# Patient Record
Sex: FEMALE | Race: WHITE | NOT HISPANIC OR LATINO | Employment: OTHER | URBAN - METROPOLITAN AREA
[De-identification: names, ages, dates, MRNs, and addresses within clinical notes are randomized per-mention and may not be internally consistent; named-entity substitution may affect disease eponyms.]

---

## 2023-10-13 DIAGNOSIS — G56.22 ULNAR NEUROPATHY AT WRIST, LEFT: ICD-10-CM

## 2023-10-18 DIAGNOSIS — Z96.659 TOTAL KNEE REPLACEMENT STATUS, UNSPECIFIED LATERALITY: Primary | ICD-10-CM

## 2023-10-18 RX ORDER — AMOXICILLIN 500 MG/1
CAPSULE ORAL
Qty: 4 CAPSULE | Refills: 2 | Status: SHIPPED | OUTPATIENT
Start: 2023-10-18 | End: 2024-03-06 | Stop reason: HOSPADM

## 2023-11-28 ENCOUNTER — HOSPITAL ENCOUNTER (OUTPATIENT)
Dept: NEUROLOGY | Facility: HOSPITAL | Age: 78
Discharge: HOME | End: 2023-11-28
Payer: MEDICARE

## 2023-11-28 DIAGNOSIS — G56.22 ULNAR NEUROPATHY AT WRIST, LEFT: ICD-10-CM

## 2023-11-28 PROCEDURE — 95910 NRV CNDJ TEST 7-8 STUDIES: CPT

## 2023-12-04 ENCOUNTER — TRANSCRIBE ORDERS (OUTPATIENT)
Dept: OPERATING ROOM | Facility: HOSPITAL | Age: 78
End: 2023-12-04

## 2023-12-04 ENCOUNTER — OFFICE VISIT (OUTPATIENT)
Dept: ORTHOPEDIC SURGERY | Facility: CLINIC | Age: 78
End: 2023-12-04
Payer: MEDICARE

## 2023-12-04 DIAGNOSIS — M25.512 ACUTE PAIN OF LEFT SHOULDER: ICD-10-CM

## 2023-12-04 DIAGNOSIS — G56.02 CARPAL TUNNEL SYNDROME, LEFT UPPER LIMB: Primary | ICD-10-CM

## 2023-12-04 DIAGNOSIS — G56.02 CARPAL TUNNEL SYNDROME OF LEFT WRIST: Primary | ICD-10-CM

## 2023-12-04 PROCEDURE — 99214 OFFICE O/P EST MOD 30 MIN: CPT | Performed by: ORTHOPAEDIC SURGERY

## 2023-12-04 PROCEDURE — L3908 WHO COCK-UP NONMOLDE PRE OTS: HCPCS | Performed by: ORTHOPAEDIC SURGERY

## 2023-12-04 NOTE — PROGRESS NOTES
History: Rita is here for her left hand.  She still having some persistent numbness in the first and second fingers mainly.  She states it does not bother her before the left reverse shoulder replacement.  She continues to work on exercises for the shoulder and has done quite well from that standpoint.  She did obtain an EMG if she is nearly a year out from her shoulder surgery.     Past medical history: Multiple  Medications: Multiple  Allergies: No known drug allergies    Please refer to the intake H&P regarding the patient's review of systems, family history and social history as was done today    HEENT: Normal  Lungs: Clear to auscultation  Heart: RRR  Abdomen: Soft, nontender  Skin: clear  Extremity: She has continued numbness and tingling in the first 2 and half fingers.  There is slight thenar atrophy.  She can fully flex and extend each digit.  No ulnar-sided numbness or tingling.  Mildly positive Tinel's at the wrist.  She get the arm overhead as well as internal rotation to L5.  Decent shoulder strength with gentle stressing.  Contralateral exam is normal for strength, motion, stability and neurovascular assessment.    Radiographs: EMGs show mild to moderate carpal tunnel syndrome on the left side.    Assessment: Left carpal tunnel syndrome, stable left reverse shoulder arthroplasty.    Plan: She has had persistent numbness ever since her surgery.  At this point she is dropping out of it she feels it is getting worse.  She would like proceed with carpal tunnel release.  I will get her a brace to wear at night after after the surgery.  There was no more proximal nerve involvement however she understands she may not that she complete relief of pain surgery this is truly a sequelae of nerve irritation from the shoulder.  This note was generated with voice recognition software and may contain grammatical errors.

## 2024-01-08 ENCOUNTER — APPOINTMENT (OUTPATIENT)
Dept: ORTHOPEDIC SURGERY | Facility: CLINIC | Age: 79
End: 2024-01-08
Payer: MEDICARE

## 2024-01-17 ENCOUNTER — APPOINTMENT (OUTPATIENT)
Dept: ORTHOPEDIC SURGERY | Facility: CLINIC | Age: 79
End: 2024-01-17
Payer: MEDICARE

## 2024-01-19 ENCOUNTER — OFFICE VISIT (OUTPATIENT)
Dept: ORTHOPEDIC SURGERY | Facility: CLINIC | Age: 79
End: 2024-01-19
Payer: MEDICARE

## 2024-01-19 DIAGNOSIS — G56.02 CARPAL TUNNEL SYNDROME OF LEFT WRIST: Primary | ICD-10-CM

## 2024-01-19 PROCEDURE — 99214 OFFICE O/P EST MOD 30 MIN: CPT | Performed by: ORTHOPAEDIC SURGERY

## 2024-01-19 PROCEDURE — 1159F MED LIST DOCD IN RCRD: CPT | Performed by: ORTHOPAEDIC SURGERY

## 2024-01-19 RX ORDER — GABAPENTIN 100 MG/1
100 CAPSULE ORAL
COMMUNITY
Start: 2023-11-10 | End: 2024-11-09

## 2024-01-19 RX ORDER — BUSPIRONE HYDROCHLORIDE 10 MG/1
1 TABLET ORAL
COMMUNITY
Start: 2023-02-07

## 2024-01-19 RX ORDER — CELECOXIB 200 MG/1
200 CAPSULE ORAL AS NEEDED
COMMUNITY
Start: 2023-11-16

## 2024-01-19 RX ORDER — FAMOTIDINE 20 MG/1
20 TABLET, FILM COATED ORAL 2 TIMES DAILY
COMMUNITY
Start: 2023-11-10

## 2024-01-19 NOTE — PROGRESS NOTES
History of Present Illness:  No chief complaint on file.      The patient presents today for evaluation of side: left hand pain.  The patient endorses numbness and tingling (predominantly radial three digits).  There is no current neck pain or cervical spine issues.  The patient has tried to the following interventions thus far:  Carpal tunnel brace .  The patient notes the pain is worse with activity such as driving or talking on the phone and at night.  The patient denies any recent trauma.  The pain is sharp, electrical in nature.    No past medical history on file.    Medication Documentation Review Audit    **Prior to Admission medications have not yet been reviewed**         Not on File    Social History     Socioeconomic History    Marital status:      Spouse name: Not on file    Number of children: Not on file    Years of education: Not on file    Highest education level: Not on file   Occupational History    Not on file   Tobacco Use    Smoking status: Not on file    Smokeless tobacco: Not on file   Substance and Sexual Activity    Alcohol use: Not on file    Drug use: Not on file    Sexual activity: Not on file   Other Topics Concern    Not on file   Social History Narrative    Not on file     Social Determinants of Health     Financial Resource Strain: Not on file   Food Insecurity: Not on file   Transportation Needs: Not on file   Physical Activity: Not on file   Stress: Not on file   Social Connections: Not on file   Intimate Partner Violence: Not on file   Housing Stability: Not on file       No past surgical history on file.       No History of diabetes or hypothyroidism.     Review of Systems   GENERAL: Negative for malaise, significant weight loss, fever  MUSCULOSKELETAL: see HPI  NEURO:  Negative     Physical Examination:  HEENT: Head is normocephalic and atraumatic  No tenderness to palpation cervical spine  Good ROM of neck  Negative Spurlings test  Chest and lungs: Clear to  auscultation  Heart: Regular rate rhythm no murmurs  Abdomen: The patient has a colostomy bag.  Positive bowel sounds soft, nontender  Extremities:  side: left wrist/hand:  No obvious swelling or masses  Thenar eminence muscle mass: thenar: mild and hypothenar: none  No atrophy noted of hypothenar eminence   Positive Tinel's at carpal tunnel  + Median nerve compression test  + Glendy's test  Decreased sensation to light touch and 2 point discrimination in median nerve distribution  Motor exam within normal limits  Radial pulse palpable  Good capillary refill     EMG: side: left carpal tunnel, mild to moderate with no superimposed radiculopathy or polyneuropathy     Assessment:  Patient with side: left carpal tunnel syndrome     Plan:  We reviewed the disease process with the patient.  We discussed the role for electrodiagnostic testing and treatment decision making, immobilization, and injections.  We discussed surgical intervention reviewing the risks (neurologic injury, wound issues including infection, pillar pain, and recurrence) and benefits.  The patient elected for: Carpal Tunnel Release Surgery.

## 2024-02-02 DIAGNOSIS — G56.02 LEFT CARPAL TUNNEL SYNDROME: Primary | ICD-10-CM

## 2024-03-05 ENCOUNTER — PREP FOR PROCEDURE (OUTPATIENT)
Dept: ORTHOPEDIC SURGERY | Facility: CLINIC | Age: 79
End: 2024-03-05
Payer: MEDICARE

## 2024-03-05 DIAGNOSIS — G56.02 CARPAL TUNNEL SYNDROME OF LEFT WRIST: Primary | ICD-10-CM

## 2024-03-05 RX ORDER — DIAPER,BRIEF,INFANT-TODD,DISP
1 EACH MISCELLANEOUS DAILY
COMMUNITY
Start: 2022-10-28

## 2024-03-05 RX ORDER — CHOLECALCIFEROL (VITAMIN D3) 125 MCG
5000 CAPSULE ORAL DAILY
COMMUNITY
Start: 2023-03-30

## 2024-03-05 RX ORDER — MECLIZINE HCL 12.5 MG 12.5 MG/1
12.5 TABLET ORAL 3 TIMES DAILY PRN
COMMUNITY
Start: 2020-05-08

## 2024-03-05 RX ORDER — SERTRALINE HYDROCHLORIDE 50 MG/1
50 TABLET, FILM COATED ORAL DAILY
COMMUNITY

## 2024-03-05 RX ORDER — LEVOTHYROXINE SODIUM 75 UG/1
75 TABLET ORAL DAILY
COMMUNITY

## 2024-03-05 RX ORDER — CYANOCOBALAMIN (VITAMIN B-12) 250 MCG
250 TABLET ORAL DAILY
COMMUNITY
Start: 2021-07-14

## 2024-03-05 RX ORDER — IBUPROFEN 800 MG/1
800 TABLET ORAL EVERY 6 HOURS PRN
COMMUNITY
Start: 2023-01-25

## 2024-03-05 NOTE — H&P
Allergies   Allergen Reactions    Aspirin-Acetaminophen-Caffeine Other    Cefadroxil Dizziness and Itching    Codeine Itching and Nausea/vomiting    Hydromorphone GI Upset and Nausea/vomiting    Sulfamethoxazole Other    Tapentadol Itching       REVIEW OF SYSTEMS  General: Negative for malaise, significant weight loss, fever  Musculoskeletal: See HPI  Neuro:  Negative for numbness/tingling      Medication Documentation Review Audit       Reviewed by Hyacinth Sanchez on 01/19/24 at 1453      Medication Order Taking? Sig Documenting Provider Last Dose Status   amoxicillin (Amoxil) 500 mg capsule [id number]  Take 4 capsules 1 hour before dental appointment Randy Machuca MD  Active   busPIRone (Buspar) 10 mg tablet [id number] Yes Take 1 tablet (10 mg) by mouth once daily in the morning. Take before meals. Historical Provider, MD  Active   celecoxib (CeleBREX) 200 mg capsule [id number] Yes One tab po bid prn Historical Provider, MD  Active   famotidine (Pepcid) 20 mg tablet [id number] Yes Take 1 tablet (20 mg) by mouth twice a day. Historical Provider, MD  Active   gabapentin (Neurontin) 100 mg capsule [id number] Yes Take 1 capsule (100 mg) by mouth once daily. Historical Provider, MD  Active                    HPI:  Left carpal tunnel syndrome, numbness and tingling in median nerve distribution, weakness, difficulty grasping items    PHYSICAL EXAM  General Appearance/Mental Status: A&Ox3, no apparent distress  HEENT: Normal  Lungs: Clear  Heart: RRR  Abdomen: Soft, nontender  Extremities: Abnormal left carpal tunnel syndrome, + tinels, + phalens, + durkins test.    Assessment:  Left carpal tunnel syndrome    Plan:  Left carpal tunnel release, all risks benefits and alternatives were discussed.

## 2024-03-05 NOTE — PREPROCEDURE INSTRUCTIONS
NPO Instructions:  Do not eat any food after midnight the night before your surgery/procedure.    Additional Instructions:

## 2024-03-05 NOTE — H&P (VIEW-ONLY)
Allergies   Allergen Reactions    Aspirin-Acetaminophen-Caffeine Other    Cefadroxil Dizziness and Itching    Codeine Itching and Nausea/vomiting    Hydromorphone GI Upset and Nausea/vomiting    Sulfamethoxazole Other    Tapentadol Itching       REVIEW OF SYSTEMS  General: Negative for malaise, significant weight loss, fever  Musculoskeletal: See HPI  Neuro:  Negative for numbness/tingling      Medication Documentation Review Audit       Reviewed by Hyacinth Sanchez on 01/19/24 at 1453      Medication Order Taking? Sig Documenting Provider Last Dose Status   amoxicillin (Amoxil) 500 mg capsule [id number]  Take 4 capsules 1 hour before dental appointment Randy Machuca MD  Active   busPIRone (Buspar) 10 mg tablet [id number] Yes Take 1 tablet (10 mg) by mouth once daily in the morning. Take before meals. Historical Provider, MD  Active   celecoxib (CeleBREX) 200 mg capsule [id number] Yes One tab po bid prn Historical Provider, MD  Active   famotidine (Pepcid) 20 mg tablet [id number] Yes Take 1 tablet (20 mg) by mouth twice a day. Historical Provider, MD  Active   gabapentin (Neurontin) 100 mg capsule [id number] Yes Take 1 capsule (100 mg) by mouth once daily. Historical Provider, MD  Active                    HPI:  Left carpal tunnel syndrome, numbness and tingling in median nerve distribution, weakness, difficulty grasping items    PHYSICAL EXAM  General Appearance/Mental Status: A&Ox3, no apparent distress  HEENT: Normal  Lungs: Clear  Heart: RRR  Abdomen: Soft, nontender  Extremities: Abnormal left carpal tunnel syndrome, + tinels, + phalens, + durkins test.    Assessment:  Left carpal tunnel syndrome    Plan:  Left carpal tunnel release, all risks benefits and alternatives were discussed.

## 2024-03-06 ENCOUNTER — HOSPITAL ENCOUNTER (OUTPATIENT)
Facility: HOSPITAL | Age: 79
Setting detail: OUTPATIENT SURGERY
Discharge: HOME | End: 2024-03-06
Attending: ORTHOPAEDIC SURGERY | Admitting: ORTHOPAEDIC SURGERY
Payer: MEDICARE

## 2024-03-06 VITALS
BODY MASS INDEX: 21.75 KG/M2 | RESPIRATION RATE: 16 BRPM | SYSTOLIC BLOOD PRESSURE: 155 MMHG | TEMPERATURE: 97 F | DIASTOLIC BLOOD PRESSURE: 74 MMHG | HEIGHT: 62 IN | HEART RATE: 63 BPM | OXYGEN SATURATION: 99 % | WEIGHT: 118.17 LBS

## 2024-03-06 DIAGNOSIS — Z98.890 S/P CARPAL TUNNEL RELEASE: ICD-10-CM

## 2024-03-06 DIAGNOSIS — G56.02 CARPAL TUNNEL SYNDROME, LEFT UPPER LIMB: Primary | ICD-10-CM

## 2024-03-06 PROCEDURE — 7100000010 HC PHASE TWO TIME - EACH INCREMENTAL 1 MINUTE: Performed by: ORTHOPAEDIC SURGERY

## 2024-03-06 PROCEDURE — 64721 CARPAL TUNNEL SURGERY: CPT | Performed by: ORTHOPAEDIC SURGERY

## 2024-03-06 PROCEDURE — 3600000008 HC OR TIME - EACH INCREMENTAL 1 MINUTE - PROCEDURE LEVEL THREE: Performed by: ORTHOPAEDIC SURGERY

## 2024-03-06 PROCEDURE — 7100000009 HC PHASE TWO TIME - INITIAL BASE CHARGE: Performed by: ORTHOPAEDIC SURGERY

## 2024-03-06 PROCEDURE — 2500000005 HC RX 250 GENERAL PHARMACY W/O HCPCS: Performed by: ORTHOPAEDIC SURGERY

## 2024-03-06 PROCEDURE — 3600000003 HC OR TIME - INITIAL BASE CHARGE - PROCEDURE LEVEL THREE: Performed by: ORTHOPAEDIC SURGERY

## 2024-03-06 RX ORDER — BUPIVACAINE HYDROCHLORIDE 2.5 MG/ML
INJECTION, SOLUTION INFILTRATION; PERINEURAL AS NEEDED
Status: DISCONTINUED | OUTPATIENT
Start: 2024-03-06 | End: 2024-03-06 | Stop reason: HOSPADM

## 2024-03-06 RX ORDER — TRAMADOL HYDROCHLORIDE 50 MG/1
50 TABLET ORAL EVERY 8 HOURS PRN
Qty: 10 TABLET | Refills: 0 | Status: SHIPPED | OUTPATIENT
Start: 2024-03-06 | End: 2024-03-09

## 2024-03-06 RX ORDER — LIDOCAINE HYDROCHLORIDE 10 MG/ML
INJECTION, SOLUTION EPIDURAL; INFILTRATION; INTRACAUDAL; PERINEURAL AS NEEDED
Status: DISCONTINUED | OUTPATIENT
Start: 2024-03-06 | End: 2024-03-06 | Stop reason: HOSPADM

## 2024-03-06 ASSESSMENT — PAIN SCALES - GENERAL
PAINLEVEL_OUTOF10: 0 - NO PAIN
PAINLEVEL_OUTOF10: 0 - NO PAIN

## 2024-03-06 ASSESSMENT — PAIN - FUNCTIONAL ASSESSMENT
PAIN_FUNCTIONAL_ASSESSMENT: 0-10
PAIN_FUNCTIONAL_ASSESSMENT: 0-10

## 2024-03-06 ASSESSMENT — COLUMBIA-SUICIDE SEVERITY RATING SCALE - C-SSRS
1. IN THE PAST MONTH, HAVE YOU WISHED YOU WERE DEAD OR WISHED YOU COULD GO TO SLEEP AND NOT WAKE UP?: NO
2. HAVE YOU ACTUALLY HAD ANY THOUGHTS OF KILLING YOURSELF?: NO
6. HAVE YOU EVER DONE ANYTHING, STARTED TO DO ANYTHING, OR PREPARED TO DO ANYTHING TO END YOUR LIFE?: NO

## 2024-03-06 NOTE — OP NOTE
Carpal Tunnel Release (L) Operative Note     Date: 3/6/2024  OR Location: [facility]    Name: Rita Gomez, : 1945, Age: 78 y.o., MRN: [id number], Sex: female    Diagnosis  Pre-op Diagnosis     * Carpal tunnel syndrome, left upper limb [G56.02] Post-op Diagnosis     * Carpal tunnel syndrome, left upper limb [G56.02]     Procedures  Carpal Tunnel Release  80618 - [facility] NEUROPLASTY &/TRANSPOS MEDIAN NRV CARPAL TUNNE      Surgeons      * Doyle Carter - Primary    Resident/Fellow/Other Assistant:  Surgeon(s) and Role:     * Nicholas Galvan PA-C - Assisting    Procedure Summary  Anesthesia: Local  ASA: ASA status not filed in the log.  Anesthesia Staff: No anesthesia staff entered.  Estimated Blood Loss: Minimal   Intra-op Medications:   Administrations occurring from 0730 to 0800 on 24:   Medication Name Total Dose   BUPivacaine HCl (Marcaine) 0.25 % (2.5 mg/mL) injection 8.5 mL   lidocaine PF (Xylocaine) 10 mg/mL (1 %) injection 8.5 mL         Intraprocedure I/O Totals       None           Specimen: No specimens collected     Staff:   Circulator: Jordyn Ceron RN; Alejandra Jauregui, RN; Coral Mclaughlin, KANDICE         Drains and/or Catheters: * None in log *    Tourniquet Times:     Total Tourniquet Time Documented:  Arm - Upper (Left) - 3 minutes  Total: Arm - Upper (Left) - 3 minutes      Implants:     Findings: Nerve hyperemia    Indications: Rita Gomez is an 78 y.o. female who is having surgery for Carpal tunnel syndrome, left upper limb [G56.02].     The patient was seen in the preoperative area. The risks, benefits, complications, treatment options, non-operative alternatives, expected recovery and outcomes were discussed with the patient. The possibilities of reaction to medication, pulmonary aspiration, injury to surrounding structures, bleeding, recurrent infection, the need for additional procedures, failure to diagnose a condition, and creating a complication requiring transfusion or operation were  discussed with the patient. The patient concurred with the proposed plan, giving informed consent.  The site of surgery was properly noted/marked if necessary per policy. The patient has been actively warmed in preoperative area. Preoperative antibiotics are not indicated. Venous thrombosis prophylaxis are not indicated.    Procedure Details: Operative findings: Inflammation of median nerve, no masses,.     Operative Indications:  Patient was noted to have carpal tunnel syndrome impacting ADL's refractory to non-surgical management.  EMG: Was consistent with carpal tunnel syndrome.  The patient wished to proceed with carpal tunnel release.  The procedure was explained along with the risks, benefits alternatives being reviewed.  Potential risk including but not limited to infection, neurovascular complication, pillar pain, wound healing problems, recurrent carpal tunnel syndrome as well as the potential need for reoperation and/or revision carpal tunnel surgery were all discussed with the patient and they consented to the procedure.     Implants: None    Operative Procedure: The patient was brought the operative suite was placed in supine position.  All bony prominences were well-padded at that point I locally prepped with an alcohol prep palmarly at the left wrist crease.  Using the no touch technique I then infiltrated my planned incision site with a 50-50 mix of quarter percent Marcaine plain 1% Xylocaine plain.  While the block was setting up a tourniquet was placed on the arm proximally over web roll a U drape was used to exclude the upper extremity distal to the tourniquet from the rest of the body.    The left hand and upper extremity with was then sterilely prepped with ChloraPrep and sterilely draped usual manner.  Next I exsanguinated the hand and upper extremity and inflated the tourniquet to 250 mmHg.  Identified landmarks I made an incision adjacent to the thenar crease.  Care was taken not to go beyond  Lopez's cardinal line.  I was careful spreading dissection through subcutaneous and fatty tissues.  Identified the transverse carpal ligament.  With the PA holding retractors I carefully incised through the transverse carpal ligament.  I completed my release distally with a blunt tipped scissors.  Proximally protecting the underlying median nerve with the skid and the subcutaneous tissues with a Z retractor I released the distal volar forearm fascia with a blunt tip scissors.  Care was taken to curve the tips of the scissors ulnarward so as to avoid the palmar sensory branch of the median nerve.  I inspected the carpal canal there is no evidence of space-occupying lesion.  I was satisfied that the nerve was adequately decompressed.  The tourniquet was released.  Hemostasis was maintained with bipolar electrocautery and gentle pressure.  The nerve did show hyperemia and signs of chronic compression.  I then thoroughly copiously irrigated with normal saline.  Attention was turned to closure.    The PA reapproximated the wound edges nylon suture.  Sterile dressings were applied just consisted of Xeroform 4 x 4's followed by a sterile soft roll.  A volar one-step wrist splint was applied followed by an Ace.       I was present and participated in the entire procedure. The Physician Assistant participated in all critical elements of the procedure under my direct supervision. The surgical incision was closed by the PA under my indirect supervision. There were no qualified residents available to assist.          The physician assistant was present for the entire case.  Given the nature of the procedure and disease process a skilled surgical assistant was necessary for the case.  The assistant was necessary for retraction and helped directly facilitate completion of the surgery.  A certified scrub tech was at the back table managing instruments and supplies for the surgical procedure.    Complications:  None; patient  tolerated the procedure well.    Disposition: PACU - hemodynamically stable.  Condition: stable         Additional Details: Not applicable    Attending Attestation: I performed the procedure.    Doyle Carter  Phone Number: [phone]

## 2024-03-06 NOTE — INTERVAL H&P NOTE
Interval History and Physical     I have interviewed and examined the patient and reviewed the recent History and Physical.  There have been no changes to the recent H&P documentation.     The patient understands the planned operation and its associated risks and benefits and agrees to proceed.     The surgical consent form has been signed.    There were no vitals taken for this visit.     Doyle Carter MD

## 2024-03-15 ENCOUNTER — OFFICE VISIT (OUTPATIENT)
Dept: ORTHOPEDIC SURGERY | Facility: CLINIC | Age: 79
End: 2024-03-15
Payer: MEDICARE

## 2024-03-15 DIAGNOSIS — Z98.890 S/P CARPAL TUNNEL RELEASE: Primary | ICD-10-CM

## 2024-03-15 PROCEDURE — 1157F ADVNC CARE PLAN IN RCRD: CPT | Performed by: ORTHOPAEDIC SURGERY

## 2024-03-15 PROCEDURE — 1036F TOBACCO NON-USER: CPT | Performed by: ORTHOPAEDIC SURGERY

## 2024-03-15 PROCEDURE — 99024 POSTOP FOLLOW-UP VISIT: CPT | Performed by: ORTHOPAEDIC SURGERY

## 2024-03-15 PROCEDURE — 1159F MED LIST DOCD IN RCRD: CPT | Performed by: ORTHOPAEDIC SURGERY

## 2024-03-15 ASSESSMENT — PAIN - FUNCTIONAL ASSESSMENT: PAIN_FUNCTIONAL_ASSESSMENT: 0-10

## 2024-03-15 ASSESSMENT — PAIN SCALES - GENERAL: PAINLEVEL_OUTOF10: 5 - MODERATE PAIN

## 2024-03-15 NOTE — PROGRESS NOTES
History of Present Illness  No chief complaint on file.    S/p side: left carpal tunnel release on March 6, 2024  Patient returns today denying any significant pain.  Endorses some relief of pre-op symptoms.  Denies any new neuro deficits. No fever or drainage.     Review of Systems   GENERAL: Negative for malaise, significant weight loss, fever  MUSCULOSKELETAL: see HPI  NEURO:  Negative     Examination  side: left wrist  Healthy incision  No erythema or drainage   Sensation intact median, ulnar and radial nerve distribution   + Opposition of thumb  Good cap refill     Assessment:  Patient status post side: left carpal tunnel release     Plan  Sutures removed  Wound is healing nicely.  Discussed wrist splint as needed.  Encouraged ROM of digits, formal OT if any difficulties.  Continue rolling a can of frozen soup and may begin scar massage in a week  Follow-up:  3 weeks  All questions answered

## 2024-04-09 ENCOUNTER — APPOINTMENT (OUTPATIENT)
Dept: ORTHOPEDIC SURGERY | Facility: CLINIC | Age: 79
End: 2024-04-09
Payer: MEDICARE

## 2024-10-09 NOTE — PROGRESS NOTES
History of Present Illness  No chief complaint on file.      Patient with known osteoarthritis of the side: left knee who presents today for repeat evaluation.  The patient notes worsening knee pain.  The patient notes stable mechanical symptoms.  The patient has tried the following modalities Rest, ice, elevation, NSAIDS, and Injections.    She recently had lysis of adhesion bowel surgery.  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  She is regaining strength and function.  She is using a rollator walker.    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 Sig Documenting Provider Last Dose Status   busPIRone (Buspar) 10 mg tablet [id number] No Take 1 tablet (10 mg) by mouth once daily in the morning. Take before meals. In addition 15 mg bedtime Erasmo Salmeron MD 3/5/2024 Active   calcium citrate-vitamin D3 250 mg-5 mcg (200 unit) tablet [id number] No Take 1 tablet by mouth once daily. Historical Provider, MD Past Week Active   celecoxib (CeleBREX) 200 mg capsule [id number] No Take 1 capsule (200 mg) by mouth if needed. Erasmo Salmeron MD Past Week 3/3/2024 Active   cholecalciferol (Vitamin D-3) 125 MCG (5000 UT) capsule [id number] No Take 1 capsule (125 mcg) by mouth once daily. Erasmo Salmeron MD Past Week Active   cyanocobalamin (Vitamin B-12) 250 mcg tablet [id number] No  Take 1 tablet (250 mcg) by mouth once daily. Erasmo Salmeron MD Past Week Active   famotidine (Pepcid) 20 mg tablet [id number] No Take 1 tablet (20 mg) by mouth twice a day. Erasmo Salmeron MD Not Taking Active   gabapentin (Neurontin) 100 mg capsule [id number] No Take 1 capsule (100 mg) by mouth once daily. Erasmo Salmeron MD 3/5/2024 Active   ibuprofen 800 mg tablet [id number] No Take 1 tablet (800 mg) by mouth every 6 hours if needed. ON HOLD Erasmo Salmeron MD Past Month Active   levothyroxine (Synthroid, Levoxyl) 75 mcg tablet [id number] No Take 1 tablet (75 mcg) by mouth once daily. Erasmo Salmeron MD 3/6/2024 0200 Active   meclizine (Antivert) 12.5 mg tablet [id number] No Take 1 tablet (12.5 mg) by mouth 3 times a day as needed. Erasmo Salmeron MD More than a month Active   sertraline (Zoloft) 50 mg tablet [id number] No Take 1 tablet (50 mg) by mouth once daily. Erasmo Salmeron MD 3/5/2024 Active   traMADol (Ultram) 50 mg tablet [id number]  Take 1 tablet (50 mg) by mouth every 8 hours if needed for severe pain (7 - 10) for up to 3 days. Nicholas Galvan PA-C   24 [id number]                    Allergies   Allergen Reactions    Cefadroxil Itching and Dizziness    Codeine Itching and Nausea/vomiting    Hydromorphone GI Upset and Nausea/vomiting    Sulfamethoxazole Itching    Tapentadol Itching       Social History     Socioeconomic History    Marital status:      Spouse name: Not on file    Number of children: Not on file    Years of education: Not on file    Highest education level: Not on file   Occupational History    Not on file   Tobacco Use    Smoking status: Never    Smokeless tobacco: Never   Vaping Use    Vaping status: Never Used   Substance and Sexual Activity    Alcohol use: Yes     Alcohol/week: 1.0 standard drink of alcohol     Types: 1 Glasses of wine per week     Comment: 1-2 TIMES PER WEEK    Drug use: Never    Sexual activity: Yes     Partners: Male    Other Topics Concern    Not on file   Social History Narrative    Not on file     Social Determinants of Health     Financial Resource Strain: Low Risk  (3/20/2024)    Received from [facility]    Overall Financial Resource Strain (CARDIA)     Difficulty of Paying Living Expenses: Not hard at all   Food Insecurity: No Food Insecurity (9/15/2024)    Received from [facility]    Hunger Vital Sign     Worried About Running Out of Food in the Last Year: Never true     Ran Out of Food in the Last Year: Never true   Transportation Needs: No Transportation Needs (9/15/2024)    Received from [facility]    PRAPARE - Transportation     Lack of Transportation (Medical): No     Lack of Transportation (Non-Medical): No   Physical Activity: Sufficiently Active (3/20/2024)    Received from [facility]    Exercise Vital Sign     Days of Exercise per Week: 5 days     Minutes of Exercise per Session: 30 min   Stress: No Stress Concern Present (3/20/2024)    Received from [facility]    [employer] - Occupational Stress Questionnaire     Feeling of Stress : Only a little   Social Connections: Socially Integrated (3/20/2024)    Received from [facility]    Social Connection and Isolation Panel [NHANES]     Frequency of Communication with Friends and Family: More than three times a week     Frequency of Social Gatherings with Friends and Family: More than three times a week     Attends Restorationism Services: More than 4 times per year     Active Member of Clubs or Organizations: Not on file     Attends Club or Organization Meetings: More than 4 times per year     Marital Status:    Intimate Partner Violence: Not on file   Housing Stability: Low Risk  (9/15/2024)    Received from [facility]    Housing Stability Vital Sign     Unable to Pay for Housing in the Last Year: No     Number of Times Moved in the Last Year: 0     Homeless in the Last Year: No       Past  Surgical History:   Procedure Laterality Date    BUNIONECTOMY      RIGHT    CATARACT EXTRACTION      COLONOSCOPY      ILEOSTOMY REVISION      JOINT REPLACEMENT      LEFT SHOULDER    ROTATOR CUFF REPAIR      LEFT    SIGMOIDECTOMY      TMJ LT      X2    TUNNELED VENOUS PORT PLACEMENT      RIGHT CHEST            Review of Systems   GENERAL: Negative for malaise, significant weight loss, fever  MUSCULOSKELETAL: see HPI  NEURO:  Negative    BMI  21.6    Exam  side: left Knee:  Skin healthy and intact  No gross swelling or ecchymosis  Alignment: mild valgus  Correctable: full  Effusion: mild  ROM:  0-125 degrees  Patellofemoral crepitance with range of motion  No pain with internal rotation of the hip  4+/5 strength with hip flexion and knee extension  Tenderness to palpation: medial, lateral Pain with patellar compression: Yes  No laxity to valgus stress  No laxity to varus stress  Negative anterior drawer  negative Doris´s test     Neurovascular exam normal distally       Imaging  See dictated report       Assessment  Patient with known osteoarthritis of the side: left knee     Plan  We reviewed an evidence-based approach to OA of the knee.  We discussed past treatments as well options for future treatment.  We will try a corticosteroid injection to the left knee today.  Continue to work on hip flexion and knee extension exercises.  Follow-up as needed  Questions answered    L Inj/Asp: L knee on 10/10/2024 8:50 AM  Indications: pain  Details: 21 G needle, anterolateral approach  Medications: 1 mL lidocaine 10 mg/mL (1 %); 1 mL betamethasone acet,sod phos 6 mg/mL  Outcome: tolerated well, no immediate complications  Consent was given by the patient. Patient was prepped and draped in the usual sterile fashion.

## 2024-10-10 ENCOUNTER — APPOINTMENT (OUTPATIENT)
Dept: ORTHOPEDIC SURGERY | Facility: CLINIC | Age: 79
End: 2024-10-10
Payer: MEDICARE

## 2024-10-10 ENCOUNTER — HOSPITAL ENCOUNTER (OUTPATIENT)
Dept: RADIOLOGY | Facility: CLINIC | Age: 79
Discharge: HOME | End: 2024-10-10
Payer: MEDICARE

## 2024-10-10 DIAGNOSIS — M25.562 LEFT KNEE PAIN, UNSPECIFIED CHRONICITY: ICD-10-CM

## 2024-10-10 DIAGNOSIS — M17.12 PRIMARY OSTEOARTHRITIS OF LEFT KNEE: Primary | ICD-10-CM

## 2024-10-10 PROCEDURE — 73564 X-RAY EXAM KNEE 4 OR MORE: CPT | Mod: LT

## 2024-10-10 PROCEDURE — 20610 DRAIN/INJ JOINT/BURSA W/O US: CPT | Performed by: ORTHOPAEDIC SURGERY

## 2024-10-10 PROCEDURE — 1157F ADVNC CARE PLAN IN RCRD: CPT | Performed by: ORTHOPAEDIC SURGERY

## 2024-10-10 PROCEDURE — 99214 OFFICE O/P EST MOD 30 MIN: CPT | Performed by: ORTHOPAEDIC SURGERY

## 2024-10-10 RX ORDER — BETAMETHASONE SODIUM PHOSPHATE AND BETAMETHASONE ACETATE 3; 3 MG/ML; MG/ML
1 INJECTION, SUSPENSION INTRA-ARTICULAR; INTRALESIONAL; INTRAMUSCULAR; SOFT TISSUE
Status: COMPLETED | OUTPATIENT
Start: 2024-10-10 | End: 2024-10-10

## 2024-10-10 RX ORDER — LIDOCAINE HYDROCHLORIDE 10 MG/ML
1 INJECTION, SOLUTION INFILTRATION; PERINEURAL
Status: COMPLETED | OUTPATIENT
Start: 2024-10-10 | End: 2024-10-10

## 2024-12-31 ENCOUNTER — APPOINTMENT (OUTPATIENT)
Dept: ORTHOPEDIC SURGERY | Facility: CLINIC | Age: 79
End: 2024-12-31
Payer: MEDICARE

## 2024-12-31 DIAGNOSIS — M25.462 EFFUSION OF LEFT KNEE: ICD-10-CM

## 2024-12-31 DIAGNOSIS — M17.12 PRIMARY OSTEOARTHRITIS OF LEFT KNEE: Primary | ICD-10-CM

## 2024-12-31 PROCEDURE — 20610 DRAIN/INJ JOINT/BURSA W/O US: CPT | Performed by: ORTHOPAEDIC SURGERY

## 2024-12-31 PROCEDURE — 99214 OFFICE O/P EST MOD 30 MIN: CPT | Performed by: ORTHOPAEDIC SURGERY

## 2024-12-31 PROCEDURE — 1157F ADVNC CARE PLAN IN RCRD: CPT | Performed by: ORTHOPAEDIC SURGERY

## 2024-12-31 RX ORDER — LIDOCAINE HYDROCHLORIDE 10 MG/ML
1 INJECTION, SOLUTION INFILTRATION; PERINEURAL
Status: COMPLETED | OUTPATIENT
Start: 2024-12-31 | End: 2024-12-31

## 2024-12-31 RX ORDER — BETAMETHASONE SODIUM PHOSPHATE AND BETAMETHASONE ACETATE 3; 3 MG/ML; MG/ML
1 INJECTION, SUSPENSION INTRA-ARTICULAR; INTRALESIONAL; INTRAMUSCULAR; SOFT TISSUE
Status: COMPLETED | OUTPATIENT
Start: 2024-12-31 | End: 2024-12-31

## 2024-12-31 RX ADMIN — LIDOCAINE HYDROCHLORIDE 1 ML: 10 INJECTION, SOLUTION INFILTRATION; PERINEURAL at 14:29

## 2024-12-31 RX ADMIN — BETAMETHASONE SODIUM PHOSPHATE AND BETAMETHASONE ACETATE 1 ML: 3; 3 INJECTION, SUSPENSION INTRA-ARTICULAR; INTRALESIONAL; INTRAMUSCULAR; SOFT TISSUE at 14:29

## 2024-12-31 NOTE — PROGRESS NOTES
History of Present Illness  No chief complaint on file.      Patient with known osteoarthritis of the side: left knee who presents today for repeat evaluation.  The patient notes worsening knee pain.  The patient notes worsening mechanical symptoms.  Complaint is that the knee feels tight along with an anterior pain.  The patient has tried the following modalities Rest, ice, elevation and Injections past.  The patient cannot take anti-inflammatory medications.  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 Sig Documenting Provider Last Dose Status   busPIRone (Buspar) 10 mg tablet [id number] No Take 1 tablet (10 mg) by mouth once daily in the morning. Take before meals. In addition 15 mg bedtime Historical Provider, MD 3/5/2024 Active   calcium citrate-vitamin D3 250 mg-5 mcg (200 unit) tablet [id number] No Take 1 tablet by mouth once daily. Historical Provider, MD Past Week Active   celecoxib (CeleBREX) 200 mg capsule [id number] No Take 1 capsule (200 mg) by mouth if needed. Erasmo Provider, MD Past Week 3/3/2024 Active   cholecalciferol (Vitamin D-3) 125 MCG (5000 UT) capsule [id number] No Take 1 capsule (125 mcg) by mouth once daily. Historical Provider, MD Past Week Active   cyanocobalamin (Vitamin B-12) 250 mcg tablet [id number]  No Take 1 tablet (250 mcg) by mouth once daily. Erasmo Salmeron MD Past Week Active   famotidine (Pepcid) 20 mg tablet [id number] No Take 1 tablet (20 mg) by mouth twice a day. Erasmo Salmeron MD Not Taking Active   gabapentin (Neurontin) 100 mg capsule [id number] No Take 1 capsule (100 mg) by mouth once daily. Erasmo Salmeron MD 3/5/2024 Active   ibuprofen 800 mg tablet [id number] No Take 1 tablet (800 mg) by mouth every 6 hours if needed. ON HOLD Erasmo Salmeron MD Past Month Active   levothyroxine (Synthroid, Levoxyl) 75 mcg tablet [id number] No Take 1 tablet (75 mcg) by mouth once daily. Erasmo Salmeron MD 3/6/2024 0200 Active   meclizine (Antivert) 12.5 mg tablet [id number] No Take 1 tablet (12.5 mg) by mouth 3 times a day as needed. Erasmo Salmeron MD More than a month Active   sertraline (Zoloft) 50 mg tablet [id number] No Take 1 tablet (50 mg) by mouth once daily. Erasmo Salmeron MD 3/5/2024 Active   traMADol (Ultram) 50 mg tablet [id number]  Take 1 tablet (50 mg) by mouth every 8 hours if needed for severe pain (7 - 10) for up to 3 days. Nicholas Galvan PA-C   [id number]                    Allergies   Allergen Reactions    Cefadroxil Itching and Dizziness    Codeine Itching and Nausea/vomiting    Hydromorphone GI Upset and Nausea/vomiting    Sulfamethoxazole Itching    Tapentadol Itching       Social History     Socioeconomic History    Marital status:      Spouse name: Not on file    Number of children: Not on file    Years of education: Not on file    Highest education level: Not on file   Occupational History    Not on file   Tobacco Use    Smoking status: Never    Smokeless tobacco: Never   Vaping Use    Vaping status: Never Used   Substance and Sexual Activity    Alcohol use: Yes     Alcohol/week: 1.0 standard drink of alcohol     Types: 1 Glasses of wine per week     Comment: 1-2 TIMES PER WEEK    Drug use: Never    Sexual activity: Yes     Partners: Male    Other Topics Concern    Not on file   Social History Narrative    Not on file     Social Drivers of Health     Financial Resource Strain: Low Risk  (3/20/2024)    Received from [facility]    Overall Financial Resource Strain (CARDIA)     Difficulty of Paying Living Expenses: Not hard at all   Food Insecurity: No Food Insecurity (9/15/2024)    Received from [facility]    Hunger Vital Sign     Worried About Running Out of Food in the Last Year: Never true     Ran Out of Food in the Last Year: Never true   Transportation Needs: No Transportation Needs (9/15/2024)    Received from [facility]    PRAPARE - Transportation     Lack of Transportation (Medical): No     Lack of Transportation (Non-Medical): No   Physical Activity: Sufficiently Active (3/20/2024)    Received from [facility]    Exercise Vital Sign     Days of Exercise per Week: 5 days     Minutes of Exercise per Session: 30 min   Stress: No Stress Concern Present (3/20/2024)    Received from [facility]    [employer] - Occupational Stress Questionnaire     Feeling of Stress : Only a little   Social Connections: Socially Integrated (3/20/2024)    Received from [facility]    Social Connection and Isolation Panel [NHANES]     Frequency of Communication with Friends and Family: More than three times a week     Frequency of Social Gatherings with Friends and Family: More than three times a week     Attends Judaism Services: More than 4 times per year     Active Member of Clubs or Organizations: Not on file     Attends Club or Organization Meetings: More than 4 times per year     Marital Status:    Intimate Partner Violence: Not on file   Housing Stability: Low Risk  (9/15/2024)    Received from [facility]    Housing Stability Vital Sign     Unable to Pay for Housing in the Last Year: No     Number of Times Moved in the Last Year: 0     Homeless in the Last Year: No       Past Surgical  History:   Procedure Laterality Date    BUNIONECTOMY      RIGHT    CATARACT EXTRACTION      COLONOSCOPY      ILEOSTOMY REVISION      JOINT REPLACEMENT      LEFT SHOULDER    ROTATOR CUFF REPAIR      LEFT    SIGMOIDECTOMY      TMJ LT      X2    TUNNELED VENOUS PORT PLACEMENT      RIGHT CHEST            Review of Systems   GENERAL: Negative for malaise, significant weight loss, fever  MUSCULOSKELETAL: see HPI  NEURO:  Negative      Exam  side: left Knee:  Skin healthy and intact  No gross swelling or ecchymosis  Alignment: normal  Correctable:  Not applicable  Effusion: moderate  ROM:  0-90 degrees  Patellar crepitance with range of motion  No pain with internal rotation of the hip  Tenderness to palpation: medial Pain with patellar compression: Yes  No laxity to valgus stress  No laxity to varus stress  Negative Lachman´s test  Negative posterior drawer test  negative Doris´s test     Neurovascular exam normal distally  2+ DP pulse and good cap refill     Imaging  XR knee left 4+ views  Narrative: Interpreted By:  Naima Schumacher,   STUDY:  Left knee 4 views.  Right knee, 3 views.      INDICATION:  Signs/Symptoms:Pain.      COMPARISON:  11/03/2022      ACCESSION NUMBER(S):  [id number]      ORDERING CLINICIAN:  DEIDRE HEMPHILL      FINDINGS:  Left knee:  No acute fracture or malalignment.  Mild lateral and patellofemoral compartment degenerative changes with  osteophytes. No significant knee joint effusion.  Soft tissues are unremarkable.      Right Knee:  No acute fracture or malalignment.  Moderate lateral compartment degenerative changes with joint space  loss.      Impression: 1. Mild lateral/patellofemoral compartment degenerative changes  osteoarthrosis of the left knee.  2. Moderate lateral compartment osteoarthrosis of the right knee.      MACRO:      None      Signed by: Naima Schumacher 10/11/2024 6:12 PM  Dictation workstation:   [id number]         Assessment  Patient with known osteoarthritis of the side:  left knee  Painful effusion left knee     Plan  We reviewed an evidence-based approach to OA of the knee.  We discussed past treatments as well options for future treatment.  Aspiration and corticosteroid injection since the patient cannot take anti-inflammatory medications.    Follow-up as needed  All questions were answered    L Inj/Asp: L knee on 12/31/2024 2:29 PM  Indications: pain  Details: 21 G needle, superolateral approach  Medications: 1 mL lidocaine 10 mg/mL (1 %); 1 mL betamethasone acet,sod phos 6 mg/mL  Aspirate: 30 mL serous  Outcome: tolerated well, no immediate complications  Procedure, treatment alternatives, risks and benefits explained, specific risks discussed. Consent was given by the patient. Immediately prior to procedure a time out was called to verify the correct patient, procedure, equipment, support staff and site/side marked as required. Patient was prepped and draped in the usual sterile fashion.

## 2025-01-28 ENCOUNTER — OFFICE VISIT (OUTPATIENT)
Dept: ORTHOPEDIC SURGERY | Facility: CLINIC | Age: 80
End: 2025-01-28
Payer: MEDICARE

## 2025-01-28 VITALS — HEIGHT: 62 IN | BODY MASS INDEX: 21.35 KG/M2 | WEIGHT: 116 LBS

## 2025-01-28 DIAGNOSIS — M17.12 PRIMARY OSTEOARTHRITIS OF LEFT KNEE: Primary | ICD-10-CM

## 2025-01-28 PROCEDURE — 99213 OFFICE O/P EST LOW 20 MIN: CPT | Performed by: ORTHOPAEDIC SURGERY

## 2025-01-28 PROCEDURE — 20610 DRAIN/INJ JOINT/BURSA W/O US: CPT | Performed by: ORTHOPAEDIC SURGERY

## 2025-01-28 PROCEDURE — 1036F TOBACCO NON-USER: CPT | Performed by: ORTHOPAEDIC SURGERY

## 2025-01-28 PROCEDURE — 1157F ADVNC CARE PLAN IN RCRD: CPT | Performed by: ORTHOPAEDIC SURGERY

## 2025-01-28 PROCEDURE — 1159F MED LIST DOCD IN RCRD: CPT | Performed by: ORTHOPAEDIC SURGERY

## 2025-01-28 RX ORDER — BETAMETHASONE SODIUM PHOSPHATE AND BETAMETHASONE ACETATE 3; 3 MG/ML; MG/ML
1 INJECTION, SUSPENSION INTRA-ARTICULAR; INTRALESIONAL; INTRAMUSCULAR; SOFT TISSUE
Status: COMPLETED | OUTPATIENT
Start: 2025-01-28 | End: 2025-01-28

## 2025-01-28 RX ORDER — LIDOCAINE HYDROCHLORIDE 10 MG/ML
1 INJECTION, SOLUTION INFILTRATION; PERINEURAL
Status: COMPLETED | OUTPATIENT
Start: 2025-01-28 | End: 2025-01-28

## 2025-01-28 RX ADMIN — BETAMETHASONE SODIUM PHOSPHATE AND BETAMETHASONE ACETATE 1 ML: 3; 3 INJECTION, SUSPENSION INTRA-ARTICULAR; INTRALESIONAL; INTRAMUSCULAR; SOFT TISSUE at 15:41

## 2025-01-28 RX ADMIN — LIDOCAINE HYDROCHLORIDE 1 ML: 10 INJECTION, SOLUTION INFILTRATION; PERINEURAL at 15:41

## 2025-01-28 NOTE — PROGRESS NOTES
History of Present Illness   Patient is here for her left knee.  She developed swelling and pain again.  She complains of a lateral and posterior lateral pain.  She rates her pain a 10 out of 10.  She saw her otologist who aspirated her knee.  The crystal analysis was negative.  There were 28,000 white cells.     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 Sig Documenting Provider Last Dose Status   busPIRone (Buspar) 10 mg tablet [id number] No Take 1 tablet (10 mg) by mouth once daily in the morning. Take before meals. In addition 15 mg bedtime Erasmo Salmeron MD 3/5/2024 Active   calcium citrate-vitamin D3 250 mg-5 mcg (200 unit) tablet [id number] No Take 1 tablet by mouth once daily. Erasmo Salmeron MD Past Week Active   celecoxib (CeleBREX) 200 mg capsule [id number] No Take 1 capsule (200 mg) by mouth if needed. Erasmo Salmeron MD Past Week 3/3/2024 Active   cholecalciferol (Vitamin D-3) 125 MCG (5000 UT) capsule [id number] No Take 1 capsule (125 mcg) by mouth once daily. Erasmo Salmeron MD Past Week Active   cyanocobalamin (Vitamin B-12) 250 mcg tablet [id number] No Take 1 tablet (250 mcg) by mouth once daily. Erasmo Salmeron MD Past Week Active    famotidine (Pepcid) 20 mg tablet [id number] No Take 1 tablet (20 mg) by mouth twice a day. Erasmo Salmeron MD Not Taking Active   gabapentin (Neurontin) 100 mg capsule [id number] No Take 1 capsule (100 mg) by mouth once daily. Erasmo Salmeron MD 3/5/2024  24 2359   ibuprofen 800 mg tablet [id number] No Take 1 tablet (800 mg) by mouth every 6 hours if needed. ON HOLD Erasmo Salmeron MD Past Month Active   levothyroxine (Synthroid, Levoxyl) 75 mcg tablet [id number] No Take 1 tablet (75 mcg) by mouth once daily. Erasmo Salmeron MD 3/6/2024 0200 Active   meclizine (Antivert) 12.5 mg tablet [id number] No Take 1 tablet (12.5 mg) by mouth 3 times a day as needed. Erasmo Salmeron MD More than a month Active   sertraline (Zoloft) 50 mg tablet [id number] No Take 1 tablet (50 mg) by mouth once daily. Erasmo Salmeron MD 3/5/2024 Active                     Physical Exam  This is a female in no acute distress  Left knee:   The skin is intact  There is an effusion, there is no erythema or warmth   Range of motion: 0-100 degrees  There is a parapatellar pain and lateral joint line pain.     Imaging  XR knee left 4+ views  Narrative: Interpreted By:  Naima Schumacher,   STUDY:  Left knee 4 views.  Right knee, 3 views.      INDICATION:  Signs/Symptoms:Pain.      COMPARISON:  2022      ACCESSION NUMBER(S):  [id number]      ORDERING CLINICIAN:  DEIDRE HEMPHILL      FINDINGS:  Left knee:  No acute fracture or malalignment.  Mild lateral and patellofemoral compartment degenerative changes with  osteophytes. No significant knee joint effusion.  Soft tissues are unremarkable.      Right Knee:  No acute fracture or malalignment.  Moderate lateral compartment degenerative changes with joint space  loss.      Impression: 1. Mild lateral/patellofemoral compartment degenerative changes  osteoarthrosis of the left knee.  2. Moderate lateral compartment osteoarthrosis of the right knee.      MACRO:       None      Signed by: Naima Schumacher 10/11/2024 6:12 PM  Dictation workstation:   [id number]       Assessment   Osteoarthrosis left knee     Plan  Since she is quite a bit of pain and I would hesitate to place her on any anti-inflammatory medications we will aspirate and inject the knee again with cortisone.  I will submit for viscoelastic supplement injection at it would be 3 months until she could proceed with a knee arthroplasty due to the corticosteroid injection  I would recommend that she avoid walking and exercising for a few weeks to allow things to settle down.  She may lightly paddle a stationary bike in 3 weeks or so.  Questions answered    L Inj/Asp: L knee on 1/28/2025 3:41 PM  Indications: pain  Details: 21 G needle, superolateral approach  Medications: 1 mL lidocaine 10 mg/mL (1 %); 1 mL betamethasone acet,sod phos 6 mg/mL  Aspirate: 18 mL serous  Outcome: tolerated well, no immediate complications  Consent was given by the patient. Patient was prepped and draped in the usual sterile fashion.

## 2025-01-30 ENCOUNTER — APPOINTMENT (OUTPATIENT)
Dept: ORTHOPEDIC SURGERY | Facility: CLINIC | Age: 80
End: 2025-01-30
Payer: MEDICARE

## 2025-02-10 NOTE — PROGRESS NOTES
History of Present Illness   The patient is here for the Synvisc 1 injection to her left knee.  She complains of 7 out of 10 pain.  She is also being worked up for her left lower extremity weakness and numbness.  She also has symptoms of burning.  She has seen a neurologist and testing has been ordered.     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 Sig Documenting Provider Last Dose Status   busPIRone (Buspar) 10 mg tablet [id number] No Take 1 tablet (10 mg) by mouth once daily in the morning. Take before meals. In addition 15 mg bedtime Erasmo Salmeron MD 3/5/2024 Active   calcium citrate-vitamin D3 250 mg-5 mcg (200 unit) tablet [id number] No Take 1 tablet by mouth once daily. Erasmo Salmreon MD Past Week Active   celecoxib (CeleBREX) 200 mg capsule [id number] No Take 1 capsule (200 mg) by mouth if needed. Erasmo Salmeron MD Past Week 3/3/2024 Active   cholecalciferol (Vitamin D-3) 125 MCG (5000 UT) capsule [id number] No Take 1 capsule (125 mcg) by mouth once daily. Erasmo Salmeron MD Past Week Active   cyanocobalamin (Vitamin B-12) 250 mcg tablet [id number] No Take 1 tablet (250 mcg) by mouth once daily. Erasmo Salmeron MD Past Week Active    famotidine (Pepcid) 20 mg tablet [id number] No Take 1 tablet (20 mg) by mouth twice a day. Erasmo Salmeron MD Not Taking Active   gabapentin (Neurontin) 100 mg capsule [id number] No Take 1 capsule (100 mg) by mouth once daily. Erasmo Salmeron MD 3/5/2024  24 2359   ibuprofen 800 mg tablet [id number] No Take 1 tablet (800 mg) by mouth every 6 hours if needed. ON HOLD Erasmo Salmeron MD Past Month Active   levothyroxine (Synthroid, Levoxyl) 75 mcg tablet [id number] No Take 1 tablet (75 mcg) by mouth once daily. Erasmo Salmeron MD 3/6/2024 0200 Active   meclizine (Antivert) 12.5 mg tablet [id number] No Take 1 tablet (12.5 mg) by mouth 3 times a day as needed. Erasmo Salmeron MD More than a month Active   sertraline (Zoloft) 50 mg tablet [id number] No Take 1 tablet (50 mg) by mouth once daily. Erasmo Salmeron MD 3/5/2024 Active                     Physical Exam  This is a female in no acute distress  Left knee:  The skin is intact, there is no effusion, erythema or warmth     Imaging  XR knee left 4+ views  Narrative: Interpreted By:  Naima Schumacher,   STUDY:  Left knee 4 views.  Right knee, 3 views.      INDICATION:  Signs/Symptoms:Pain.      COMPARISON:  2022      ACCESSION NUMBER(S):  [id number]      ORDERING CLINICIAN:  DEIDRE HEMPHILL      FINDINGS:  Left knee:  No acute fracture or malalignment.  Mild lateral and patellofemoral compartment degenerative changes with  osteophytes. No significant knee joint effusion.  Soft tissues are unremarkable.      Right Knee:  No acute fracture or malalignment.  Moderate lateral compartment degenerative changes with joint space  loss.      Impression: 1. Mild lateral/patellofemoral compartment degenerative changes  osteoarthrosis of the left knee.  2. Moderate lateral compartment osteoarthrosis of the right knee.      MACRO:      None      Signed by: Naima Schumacher 10/11/2024 6:12 PM  Dictation workstation:   [id number]        Assessment   Osteoarthrosis left knee     Plan  Synvisc 1 left knee  Follow-up in 2 months  All questions answered    L Inj/Asp: L knee on 2/11/2025 4:00 PM  Indications: pain  Details: 21 G needle, anterolateral approach  Medications: 6 mL hylan 48 mg/6 mL  Outcome: tolerated well, no immediate complications  Consent was given by the patient. Patient was prepped and draped in the usual sterile fashion.

## 2025-02-11 ENCOUNTER — OFFICE VISIT (OUTPATIENT)
Dept: ORTHOPEDIC SURGERY | Facility: CLINIC | Age: 80
End: 2025-02-11
Payer: MEDICARE

## 2025-02-11 DIAGNOSIS — M17.12 PRIMARY OSTEOARTHRITIS OF LEFT KNEE: Primary | ICD-10-CM

## 2025-02-11 PROCEDURE — 20610 DRAIN/INJ JOINT/BURSA W/O US: CPT | Performed by: ORTHOPAEDIC SURGERY

## 2025-02-11 PROCEDURE — 1157F ADVNC CARE PLAN IN RCRD: CPT | Performed by: ORTHOPAEDIC SURGERY

## 2025-03-26 NOTE — PROGRESS NOTES
History of Present Illness  No chief complaint on file.      Patient with known osteoarthritis of the side: left knee who presents today for repeat evaluation.  The patient notes worsening knee pain.  The patient notes stable mechanical symptoms.  The patient has tried the following modalities Rest, ice, elevation, Tylenol, and Injections.  The patient has had both corticosteroid injections as well as viscoelastic supplement injections.  She also has quadriceps and hip flexor weakness.  This is being worked up by neurology.  She has hypersensitivity as well about the knee.  She has a geniculate block scheduled in April.    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 Sig Documenting Provider Last Dose Status   busPIRone (Buspar) 10 mg tablet [id number] No Take 1 tablet (10 mg) by mouth once daily in the morning. Take before meals. In addition 15 mg bedtime Historical Provider, MD 3/5/2024 Active   calcium citrate-vitamin D3 250 mg-5 mcg (200 unit) tablet [id number] No Take 1 tablet by mouth once daily. Historical Provider, MD Past Week Active   celecoxib (CeleBREX) 200 mg capsule [id number] No Take 1 capsule (200 mg) by mouth if needed. Historical Provider, MD Past Week 3/3/2024 Active   cholecalciferol (Vitamin D-3) 125 MCG (5000 UT) capsule [id number] No Take 1 capsule (125 mcg) by mouth once daily. Historical  Provider, MD Past Week Active   cyanocobalamin (Vitamin B-12) 250 mcg tablet [id number] No Take 1 tablet (250 mcg) by mouth once daily. Historical Provider, MD Past Week Active   famotidine (Pepcid) 20 mg tablet [id number] No Take 1 tablet (20 mg) by mouth twice a day. Historical Provider, MD Not Taking Active   gabapentin (Neurontin) 100 mg capsule [id number] No Take 1 capsule (100 mg) by mouth once daily. Erasmo Salmeron MD 3/5/2024  24 2359   ibuprofen 800 mg tablet [id number] No Take 1 tablet (800 mg) by mouth every 6 hours if needed. ON HOLD Erasmo Salmeron MD Past Month Active   levothyroxine (Synthroid, Levoxyl) 75 mcg tablet [id number] No Take 1 tablet (75 mcg) by mouth once daily. Erasmo Salmeron MD 3/6/2024 0200 Active   meclizine (Antivert) 12.5 mg tablet [id number] No Take 1 tablet (12.5 mg) by mouth 3 times a day as needed. Erasmo Salmeron MD More than a month Active   sertraline (Zoloft) 50 mg tablet [id number] No Take 1 tablet (50 mg) by mouth once daily. Historical Provider, MD 3/5/2024 Active                    Allergies   Allergen Reactions    Cefadroxil Itching and Dizziness    Codeine Itching and Nausea/vomiting    Hydromorphone GI Upset and Nausea/vomiting    Sulfamethoxazole Itching    Tapentadol Itching       Social History     Socioeconomic History    Marital status:      Spouse name: Not on file    Number of children: Not on file    Years of education: Not on file    Highest education level: Not on file   Occupational History    Not on file   Tobacco Use    Smoking status: Never    Smokeless tobacco: Never   Vaping Use    Vaping status: Never Used   Substance and Sexual Activity    Alcohol use: Yes     Alcohol/week: 1.0 standard drink of alcohol     Types: 1 Glasses of wine per week     Comment: 1-2 TIMES PER WEEK    Drug use: Never    Sexual activity: Yes     Partners: Male   Other Topics Concern    Not on file   Social History Narrative    Not on file      Social Drivers of Health     Financial Resource Strain: Low Risk  (3/20/2024)    Received from [facility]    Overall Financial Resource Strain (CARDIA)     Difficulty of Paying Living Expenses: Not hard at all   Food Insecurity: No Food Insecurity (9/15/2024)    Received from [facility]    Hunger Vital Sign     Worried About Running Out of Food in the Last Year: Never true     Ran Out of Food in the Last Year: Never true   Transportation Needs: No Transportation Needs (9/15/2024)    Received from [facility]    PRAPARE - Transportation     Lack of Transportation (Medical): No     Lack of Transportation (Non-Medical): No   Physical Activity: Sufficiently Active (3/20/2024)    Received from [facility]    Exercise Vital Sign     Days of Exercise per Week: 5 days     Minutes of Exercise per Session: 30 min   Stress: No Stress Concern Present (3/20/2024)    Received from [facility]    [place] [employer] - Occupational Stress Questionnaire     Feeling of Stress : Only a little   Social Connections: Socially Integrated (3/20/2024)    Received from [facility]    Social Connection and Isolation Panel [NHANES]     Frequency of Communication with Friends and Family: More than three times a week     Frequency of Social Gatherings with Friends and Family: More than three times a week     Attends Alevism Services: More than 4 times per year     Active Member of Clubs or Organizations: Not on file     Attends Club or Organization Meetings: More than 4 times per year     Marital Status:    Intimate Partner Violence: Not on file   Housing Stability: Low Risk  (9/15/2024)    Received from [facility]    Housing Stability Vital Sign     Unable to Pay for Housing in the Last Year: No     Number of Times Moved in the Last Year: 0     Homeless in the Last Year: No       Past Surgical History:   Procedure Laterality Date    BUNIONECTOMY      RIGHT    CATARACT  EXTRACTION      COLONOSCOPY      ILEOSTOMY REVISION      JOINT REPLACEMENT      LEFT SHOULDER    ROTATOR CUFF REPAIR      LEFT    SIGMOIDECTOMY      TMJ LT      X2    TUNNELED VENOUS PORT PLACEMENT      RIGHT CHEST            Review of Systems   GENERAL: Negative for malaise, significant weight loss, fever  MUSCULOSKELETAL: see HPI  NEURO:  Negative        Exam  side: left Knee:  Skin healthy and intact  No gross swelling or ecchymosis  Alignment: mild valgus  Correctable: full  Effusion: mild  ROM:  0-120 degrees  Tenderness to palpation: lateral Pain with patellar compression: Yes  No laxity to valgus stress  No laxity to varus stress  Negative Lachman´s test  Negative posterior drawer test  negative Doris´s test     Neurovascular exam normal distally  2+ DP pulse and good cap refill     Imaging  XR knee left 4+ views  Narrative: Interpreted By:  Naima Schumacher,   STUDY:  Left knee 4 views.  Right knee, 3 views.      INDICATION:  Signs/Symptoms:Pain.      COMPARISON:  11/03/2022      ACCESSION NUMBER(S):  [id number]      ORDERING CLINICIAN:  DEIDRE HEMPHILL      FINDINGS:  Left knee:  No acute fracture or malalignment.  Mild lateral and patellofemoral compartment degenerative changes with  osteophytes. No significant knee joint effusion.  Soft tissues are unremarkable.      Right Knee:  No acute fracture or malalignment.  Moderate lateral compartment degenerative changes with joint space  loss.      Impression: 1. Mild lateral/patellofemoral compartment degenerative changes  osteoarthrosis of the left knee.  2. Moderate lateral compartment osteoarthrosis of the right knee.      MACRO:      None      Signed by: Naima Schumacher 10/11/2024 6:12 PM  Dictation workstation:   [id number]         Assessment  Patient with known osteoarthritis of the side: left knee     Plan  We reviewed an evidence-based approach to OA of the knee.  We discussed past treatments as well options for future treatment.  The patient wishes  to try knee aspiration  Knee aspiration  She has a geniculate block coming up for some of the hypersensitivity that she has about the knee  Discussed knee arthroplasty as the curative process    L Inj/Asp: L knee on 3/27/2025 9:29 AM  Indications: joint swelling and pain  Details: 21 G needle, superolateral approach  Aspirate: 7 mL  Outcome: tolerated well, no immediate complications  Consent was given by the patient. Patient was prepped and draped in the usual sterile fashion.

## 2025-03-27 ENCOUNTER — APPOINTMENT (OUTPATIENT)
Dept: ORTHOPEDIC SURGERY | Facility: CLINIC | Age: 80
End: 2025-03-27
Payer: MEDICARE

## 2025-03-27 DIAGNOSIS — M17.12 PRIMARY OSTEOARTHRITIS OF LEFT KNEE: Primary | ICD-10-CM

## 2025-03-27 DIAGNOSIS — M25.462 EFFUSION OF LEFT KNEE: ICD-10-CM

## 2025-03-27 PROCEDURE — 1036F TOBACCO NON-USER: CPT | Performed by: ORTHOPAEDIC SURGERY

## 2025-03-27 PROCEDURE — 99213 OFFICE O/P EST LOW 20 MIN: CPT | Performed by: ORTHOPAEDIC SURGERY

## 2025-03-27 PROCEDURE — 1157F ADVNC CARE PLAN IN RCRD: CPT | Performed by: ORTHOPAEDIC SURGERY

## 2025-03-27 PROCEDURE — 1159F MED LIST DOCD IN RCRD: CPT | Performed by: ORTHOPAEDIC SURGERY

## 2025-03-27 PROCEDURE — 20610 DRAIN/INJ JOINT/BURSA W/O US: CPT | Performed by: ORTHOPAEDIC SURGERY

## 2025-04-24 ENCOUNTER — OFFICE VISIT (OUTPATIENT)
Dept: ORTHOPEDIC SURGERY | Facility: CLINIC | Age: 80
End: 2025-04-24
Payer: MEDICARE

## 2025-04-24 ENCOUNTER — APPOINTMENT (OUTPATIENT)
Dept: ORTHOPEDIC SURGERY | Facility: CLINIC | Age: 80
End: 2025-04-24
Payer: MEDICARE

## 2025-04-24 VITALS — BODY MASS INDEX: 21.22 KG/M2 | WEIGHT: 115.3 LBS | HEIGHT: 62 IN

## 2025-04-24 DIAGNOSIS — M17.12 PRIMARY OSTEOARTHRITIS OF LEFT KNEE: Primary | ICD-10-CM

## 2025-04-24 PROCEDURE — 1157F ADVNC CARE PLAN IN RCRD: CPT | Performed by: ORTHOPAEDIC SURGERY

## 2025-04-24 PROCEDURE — 99214 OFFICE O/P EST MOD 30 MIN: CPT | Performed by: ORTHOPAEDIC SURGERY

## 2025-04-24 NOTE — PROGRESS NOTES
History of Present Illness  Chief Complaint   Patient presents with    Left Knee - Pain, Injections     S/P Sinvisc 3/27/25       Patient with known osteoarthritis of the side: left knee who presents today for repeat evaluation.  The patient notes worsening knee pain.  The patient notes stable mechanical symptoms.  The patient has tried the following modalities Physical therapy and Injections.    She complains of numbness around the knee.  She was to have a geniculate block but this was denied by insurance as was any more lumbar injections.    Medical History[1]    Medication Documentation Review Audit       Reviewed by Kwame Navas (Technologist) on 25 at 0852      Medication Order Taking? Sig Documenting Provider Last Dose Status   busPIRone (Buspar) 10 mg tablet [id number] No Take 1 tablet (10 mg) by mouth once daily in the morning. Take before meals. In addition 15 mg bedtime Erasmo Salmeron MD 3/5/2024 Active   calcium citrate-vitamin D3 250 mg-5 mcg (200 unit) tablet [id number] No Take 1 tablet by mouth once daily. Erasmo Salmeron MD Past Week Active   celecoxib (CeleBREX) 200 mg capsule [id number] No Take 1 capsule (200 mg) by mouth if needed. Erasmo Salmeron MD Past Week 3/3/2024 Active   cholecalciferol (Vitamin D-3) 125 MCG (5000 UT) capsule [id number] No Take 1 capsule (125 mcg) by mouth once daily. Erasmo Salmeron MD Past Week Active   cyanocobalamin (Vitamin B-12) 250 mcg tablet [id number] No Take 1 tablet (250 mcg) by mouth once daily. Erasmo Salmeron MD Past Week Active   famotidine (Pepcid) 20 mg tablet [id number] No Take 1 tablet (20 mg) by mouth twice a day. Erasmo Salmeron MD Not Taking Active   gabapentin (Neurontin) 100 mg capsule [id number] No Take 1 capsule (100 mg) by mouth once daily. Erasmo Salmeron MD 3/5/2024  24 6149   ibuprofen 800 mg tablet [id number] No Take 1 tablet (800 mg) by mouth every 6 hours if needed. ON HOLD Erasmo Salmeron  MD Past Month Active   levothyroxine (Synthroid, Levoxyl) 75 mcg tablet [id number] No Take 1 tablet (75 mcg) by mouth once daily. Historical Provider, MD 3/6/2024 0200 Active   meclizine (Antivert) 12.5 mg tablet [id number] No Take 1 tablet (12.5 mg) by mouth 3 times a day as needed. Historical Provider, MD More than a month Active   sertraline (Zoloft) 50 mg tablet [id number] No Take 1 tablet (50 mg) by mouth once daily. Historical Provider, MD 3/5/2024 Active                    RX Allergies[2]    Social History     Socioeconomic History    Marital status:      Spouse name: Not on file    Number of children: Not on file    Years of education: Not on file    Highest education level: Not on file   Occupational History    Not on file   Tobacco Use    Smoking status: Never    Smokeless tobacco: Never   Vaping Use    Vaping status: Never Used   Substance and Sexual Activity    Alcohol use: Yes     Alcohol/week: 1.0 standard drink of alcohol     Types: 1 Glasses of wine per week     Comment: 1-2 TIMES PER WEEK    Drug use: Never    Sexual activity: Yes     Partners: Male   Other Topics Concern    Not on file   Social History Narrative    Not on file     Social Drivers of Health     Financial Resource Strain: Low Risk  (4/14/2025)    Received from [facility]    Overall Financial Resource Strain (CARDIA)     Difficulty of Paying Living Expenses: Not hard at all   Food Insecurity: No Food Insecurity (4/14/2025)    Received from [facility]    Hunger Vital Sign     Worried About Running Out of Food in the Last Year: Never true     Ran Out of Food in the Last Year: Never true   Transportation Needs: No Transportation Needs (4/14/2025)    Received from [facility]    PRAPARE - Transportation     Lack of Transportation (Medical): No     Lack of Transportation (Non-Medical): No   Physical Activity: Insufficiently Active (4/14/2025)    Received from [facility]    Exercise Vital Sign     Days of Exercise  per Week: 3 days     Minutes of Exercise per Session: 40 min   Stress: Stress Concern Present (4/14/2025)    Received from [facility]    [place] [employer] Occupational Health - Occupational Stress Questionnaire     Feeling of Stress : Rather much   Social Connections: Socially Integrated (4/14/2025)    Received from [facility]    Social Connection and Isolation Panel [NHANES]     Frequency of Communication with Friends and Family: More than three times a week     Frequency of Social Gatherings with Friends and Family: More than three times a week     Attends Episcopalian Services: More than 4 times per year     Active Member of Clubs or Organizations: Yes     Attends Club or Organization Meetings: More than 4 times per year     Marital Status:    Intimate Partner Violence: Not on file   Housing Stability: Low Risk  (4/14/2025)    Received from [facility]    Housing Stability Vital Sign     Unable to Pay for Housing in the Last Year: No     Number of Times Moved in the Last Year: 0     Homeless in the Last Year: No       Surgical History[3]         Review of Systems   GENERAL: Negative for malaise, significant weight loss, fever  MUSCULOSKELETAL: see HPI  NEURO:  Negative      Exam  side: left Knee:  Alignment: mild valgus  Correctable: full  Effusion: mild  ROM:  0-100 degrees  Tenderness to palpation: lateral Pain with patellar compression: Yes  No laxity to valgus stress  No laxity to varus stress  Negative Lachman´s test  Negative posterior drawer test  negative Doris´s test     She has 4/5 strength with hip flexion and knee extension  Weakness with ankle dorsiflexion due to anterior tibial tendon injury  2+ DP pulse and good cap refill     Imaging  XR knee left 4+ views  Narrative: Interpreted By:  Naima Schumacher,   STUDY:  Left knee 4 views.  Right knee, 3 views.      INDICATION:  Signs/Symptoms:Pain.      COMPARISON:  11/03/2022      ACCESSION NUMBER(S):  [id number]       ORDERING CLINICIAN:  DEIDRE HEMPHILL      FINDINGS:  Left knee:  No acute fracture or malalignment.  Mild lateral and patellofemoral compartment degenerative changes with  osteophytes. No significant knee joint effusion.  Soft tissues are unremarkable.      Right Knee:  No acute fracture or malalignment.  Moderate lateral compartment degenerative changes with joint space  loss.      Impression: 1. Mild lateral/patellofemoral compartment degenerative changes  osteoarthrosis of the left knee.  2. Moderate lateral compartment osteoarthrosis of the right knee.      MACRO:      None      Signed by: Naima Schumacher 10/11/2024 6:12 PM  Dictation workstation:   [id number]    [facility]  Outside Information  Results  MR lumbar spine w and wo IV contrast (Order [id number])     MR lumbar spine w and wo IV contrast  Order: [id number]  Impression    IMPRESSION:    Multilevel degenerative changes are present throughout the lumbar spine  with foraminal stenosis most pronounced and advanced on the left at L4-L5  and moderate to advanced on the right at L5-S1.    Varying degrees of lateral recess stenosis, most pronounced and moderate  on the left at L3-L4 and L4-L5 is crowding of the descending left L4 and  L5 nerve roots.  No evidence of high-grade central stenosis.    Anatomic Lumbar Variant: None.  L4-5 is considered the level of the iliac  crest and assume there are 5 lumbar-type vertebrae.            : JUSTIN    Transcribe Date/Time: Feb 25 2025 10:12A    Dictated by : JOIE ORR MD    This examination was interpreted and the report reviewed and  electronically signed by:  JOIE ORR MD on Feb 25 2025 10:18AM  EST  Narrative    * * *Final Report* * *    DATE OF EXAM: Feb 24 2025  7:58PM      [facility]   0304  -  MRI LUMBAR SPINE WO/W IVCON  / ACCESSION #  [id number]    PROCEDURE REASON: Numbness of left lower extremity        * * * * Physician Interpretation * * * *     EXAMINATION:  MRI LUMBAR SPINE WO/W  IVCON    CLINICAL HISTORY:  Numbness of left lower extremity    TECHNIQUE: Routine lumbosacral spine MR protocol without and with  intravenous gadolinium.    COMPARISON: MRI lumbar spine 12/06/2020    RESULT:    Counting reference:  Lumbosacral junction.  For the purposes of this  report,  L4-5 is considered the level of the iliac crest and assume there  are 5 lumbar-type vertebrae.  Anatomic variant:  None.    Localizer images:  No additional findings.    Alignment: Dextroscoliosis is present centered at L4-L5 with right  lateral listhesis of L4 in relation to L5.    Bone marrow signal/fracture: Heterogeneous fatty marrow signal,  nonspecific.  Scattered degenerative marrow signal changes are present  throughout the lumbar vertebrae.  Mild chronic height loss of the left  aspect of the L4 and central aspect of the L5 vertebral bodies with no  evidence of osseous retropulsion.    Conus:  The conus is within normal limits of signal intensity and  morphology.    Paraspinal soft tissues:   Paraspinal soft tissues are within normal  limits.    Lower thoracic spine:  Visualized lower thoracic canal and foramina are  patent.    L1-L2:    Mild degenerative disc and facet disease with no evidence of  canal or foraminal stenosis.    L2-L3:    Mild degenerative disc and facet disease with no evidence of  canal or foraminal stenosis.    L3-L4:    Moderate degenerative disc disease with posterior broad-based  disc bulge and mild posterior endplate osteophytic ridging as well as  mild bilateral degenerative facet disease and ligamentum flavum  thickening resulting in mild to moderate left and mild right lateral  recess stenosis, mild central stenosis, as well as mild to moderate left  foraminal stenosis.    L4-L5:    Advanced degenerative disc disease with small posterior  broad-based disc bulge and mild posterior endplate osteophytic ridging as  well as advanced left and moderate right degenerative facet hypertrophy  and  ligamentum flavum thickening resulting in mild right and moderate  left lateral recess stenosis, mild central stenosis, as well as advanced  left and moderate right foraminal stenosis.    L5-S1:    Moderate to advanced degenerative disc disease with small  posterior broad-based disc bulge and mild posterior endplate osteophytic  ridging as well as moderate to advanced bilateral degenerative facet  hypertrophy and ligamentum flavum thickening resulting in moderate left  and moderate to advanced right foraminal stenosis.  No substantial canal  stenosis.    Sacrum and iliac wings:   Degenerative changes are present in the  bilateral sacroiliac joints, incompletely imaged.  Exam End: 02/24/25 19:58    Specimen Collected: 02/24/25 19:58 Last Resulted: 02/25/25 10:20   Received From: [facility]  Result Received: 03/27/25 08:52     EMG: An EMG of the left lower extremity shows a chronic mild L5 3 and L4 radiculopathy involving the myotomes.       Assessment  Patient with known osteoarthritis of the side: left knee     Plan  We reviewed an evidence-based approach to OA of the knee.  We discussed past treatments as well options for future treatment.  The patient has failed to improve with multiple non-operative modalities.  There is increasing difficulty with activities of daily living and concern for falls.  The patient is endorsing severe pain and disability.       We had a lengthy discussion regarding total knee arthroplasty including the orthopaedic risks, including but not limited to, stiffness, infection, hematoma, early aseptic loosening, neurologic or vascular injury, clicking, difficulty kneeling and incomplete relief of pain.  We reviewed the medical risks, including but not limited to, deep venous thrombosis, pulmonary embolism, and cardiovascular/pulmonary issues.     We discussed the anticipated longevity of the implants and potential for revision surgery.  We also discussed anticoagulation, rehabilitation  goals, and the hospital course.  We discussed the likelihood of opioid analgesics and risks associated with them.  The next step is to obtain medical risk stratification and encouraged the pre-operative information seminar at the hospital.  We are happy to provide assistance and counseling if the patient has any additional concerns.    I explained to her that her numbness would not be cured by the knee arthroplasty.  Also her pain management may be difficult due to her history of bowel obstructions and narcotics slowing the GI tract.    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)     COVID-19     VACCINATED    DVT (deep venous thrombosis) (Multi)     RESOLVED    Fractures     RIGHT THUMB    GERD (gastroesophageal reflux disease)     GI hemorrhage     Joint pain     Osteoporosis     Personal history of other mental and behavioral disorders     PONV (postoperative nausea and vomiting)     NAUSEA    Raynaud's disease     Rectal cancer (Multi)     Vertigo     Wears glasses    [2]   Allergies  Allergen Reactions    Cefadroxil Itching and Dizziness    Codeine Itching and Nausea/vomiting    Hydromorphone GI Upset and Nausea/vomiting    Sulfamethoxazole Itching    Tapentadol Itching   [3]   Past Surgical History:  Procedure Laterality Date    BUNIONECTOMY      RIGHT    CATARACT EXTRACTION      COLONOSCOPY      ILEOSTOMY REVISION      JOINT REPLACEMENT      LEFT SHOULDER    ROTATOR CUFF REPAIR      LEFT    SIGMOIDECTOMY      TMJ LT      X2    TUNNELED VENOUS PORT PLACEMENT      RIGHT CHEST

## 2025-05-23 DIAGNOSIS — M17.12 LEFT KNEE DJD: ICD-10-CM

## 2025-05-29 DIAGNOSIS — M17.12 LEFT KNEE DJD: ICD-10-CM

## 2025-05-30 ENCOUNTER — TELEPHONE (OUTPATIENT)
Dept: ORTHOPEDIC SURGERY | Facility: CLINIC | Age: 80
End: 2025-05-30
Payer: MEDICARE

## 2025-06-09 ENCOUNTER — LAB (OUTPATIENT)
Dept: LAB | Facility: HOSPITAL | Age: 80
End: 2025-06-09
Payer: MEDICARE

## 2025-06-09 ENCOUNTER — HOSPITAL ENCOUNTER (OUTPATIENT)
Dept: RADIOLOGY | Facility: HOSPITAL | Age: 80
Discharge: HOME | End: 2025-06-09
Payer: MEDICARE

## 2025-06-09 ENCOUNTER — HOSPITAL ENCOUNTER (OUTPATIENT)
Dept: CARDIOLOGY | Facility: HOSPITAL | Age: 80
Discharge: HOME | End: 2025-06-09
Payer: MEDICARE

## 2025-06-09 DIAGNOSIS — M17.12 LEFT KNEE DJD: ICD-10-CM

## 2025-06-09 LAB
ALBUMIN SERPL BCP-MCNC: 4.3 G/DL (ref 3.4–5)
ALP SERPL-CCNC: 57 U/L (ref 33–136)
ALT SERPL W P-5'-P-CCNC: 12 U/L (ref 7–45)
ANION GAP SERPL CALC-SCNC: 13 MMOL/L (ref 10–20)
AST SERPL W P-5'-P-CCNC: 15 U/L (ref 9–39)
BASOPHILS # BLD AUTO: 0.04 X10*3/UL (ref 0–0.1)
BASOPHILS NFR BLD AUTO: 0.5 %
BILIRUB SERPL-MCNC: 0.4 MG/DL (ref 0–1.2)
BUN SERPL-MCNC: 26 MG/DL (ref 6–23)
CALCIUM SERPL-MCNC: 9.7 MG/DL (ref 8.6–10.3)
CHLORIDE SERPL-SCNC: 103 MMOL/L (ref 98–107)
CO2 SERPL-SCNC: 24 MMOL/L (ref 21–32)
CREAT SERPL-MCNC: 0.7 MG/DL (ref 0.5–1.05)
EGFRCR SERPLBLD CKD-EPI 2021: 89 ML/MIN/1.73M*2
EOSINOPHIL # BLD AUTO: 0.06 X10*3/UL (ref 0–0.4)
EOSINOPHIL NFR BLD AUTO: 0.8 %
ERYTHROCYTE [DISTWIDTH] IN BLOOD BY AUTOMATED COUNT: 14.3 % (ref 11.5–14.5)
EST. AVERAGE GLUCOSE BLD GHB EST-MCNC: 105 MG/DL
GLUCOSE SERPL-MCNC: 87 MG/DL (ref 74–99)
HBA1C MFR BLD: 5.3 % (ref ?–5.7)
HCT VFR BLD AUTO: 40.2 % (ref 36–46)
HGB BLD-MCNC: 13.1 G/DL (ref 12–16)
IMM GRANULOCYTES # BLD AUTO: 0.03 X10*3/UL (ref 0–0.5)
IMM GRANULOCYTES NFR BLD AUTO: 0.4 % (ref 0–0.9)
INR PPP: 1 (ref 0.9–1.1)
LYMPHOCYTES # BLD AUTO: 1.96 X10*3/UL (ref 0.8–3)
LYMPHOCYTES NFR BLD AUTO: 26.5 %
MCH RBC QN AUTO: 28.9 PG (ref 26–34)
MCHC RBC AUTO-ENTMCNC: 32.6 G/DL (ref 32–36)
MCV RBC AUTO: 89 FL (ref 80–100)
MONOCYTES # BLD AUTO: 0.47 X10*3/UL (ref 0.05–0.8)
MONOCYTES NFR BLD AUTO: 6.3 %
NEUTROPHILS # BLD AUTO: 4.85 X10*3/UL (ref 1.6–5.5)
NEUTROPHILS NFR BLD AUTO: 65.5 %
NRBC BLD-RTO: 0 /100 WBCS (ref 0–0)
PLATELET # BLD AUTO: 270 X10*3/UL (ref 150–450)
POTASSIUM SERPL-SCNC: 4.3 MMOL/L (ref 3.5–5.3)
PROT SERPL-MCNC: 7.1 G/DL (ref 6.4–8.2)
PROTHROMBIN TIME: 10.5 SECONDS (ref 9.8–12.4)
RBC # BLD AUTO: 4.54 X10*6/UL (ref 4–5.2)
SODIUM SERPL-SCNC: 136 MMOL/L (ref 136–145)
WBC # BLD AUTO: 7.4 X10*3/UL (ref 4.4–11.3)

## 2025-06-09 PROCEDURE — 85610 PROTHROMBIN TIME: CPT

## 2025-06-09 PROCEDURE — 93010 ELECTROCARDIOGRAM REPORT: CPT | Performed by: INTERNAL MEDICINE

## 2025-06-09 PROCEDURE — 73700 CT LOWER EXTREMITY W/O DYE: CPT | Mod: LT

## 2025-06-09 PROCEDURE — 85025 COMPLETE CBC W/AUTO DIFF WBC: CPT

## 2025-06-09 PROCEDURE — 80053 COMPREHEN METABOLIC PANEL: CPT

## 2025-06-09 PROCEDURE — 83036 HEMOGLOBIN GLYCOSYLATED A1C: CPT

## 2025-06-09 PROCEDURE — 93005 ELECTROCARDIOGRAM TRACING: CPT

## 2025-06-13 LAB
ATRIAL RATE: 70 BPM
P AXIS: 80 DEGREES
P OFFSET: 203 MS
P ONSET: 153 MS
PR INTERVAL: 138 MS
Q ONSET: 222 MS
QRS COUNT: 12 BEATS
QRS DURATION: 76 MS
QT INTERVAL: 398 MS
QTC CALCULATION(BAZETT): 429 MS
QTC FREDERICIA: 419 MS
R AXIS: 41 DEGREES
T AXIS: 69 DEGREES
T OFFSET: 421 MS
VENTRICULAR RATE: 70 BPM

## 2025-06-17 DIAGNOSIS — Z96.652 S/P TOTAL KNEE ARTHROPLASTY, LEFT: Primary | ICD-10-CM

## 2025-06-17 PROCEDURE — RXMED WILLOW AMBULATORY MEDICATION CHARGE

## 2025-06-17 RX ORDER — OXYCODONE HYDROCHLORIDE 5 MG/1
5 TABLET ORAL EVERY 6 HOURS PRN
Qty: 28 TABLET | Refills: 0 | Status: SHIPPED | OUTPATIENT
Start: 2025-06-23 | End: 2025-06-30

## 2025-06-17 RX ORDER — DOCUSATE SODIUM 100 MG/1
100 CAPSULE, LIQUID FILLED ORAL 2 TIMES DAILY
Qty: 14 CAPSULE | Refills: 0 | Status: SHIPPED | OUTPATIENT
Start: 2025-06-23 | End: 2025-06-30

## 2025-06-17 RX ORDER — ONDANSETRON 4 MG/1
4 TABLET, FILM COATED ORAL EVERY 8 HOURS PRN
Qty: 20 TABLET | Refills: 0 | Status: SHIPPED | OUTPATIENT
Start: 2025-06-23 | End: 2025-06-30

## 2025-06-17 RX ORDER — ASPIRIN 81 MG/1
81 TABLET ORAL 2 TIMES DAILY
Qty: 60 TABLET | Refills: 0 | Status: SHIPPED | OUTPATIENT
Start: 2025-06-23 | End: 2025-07-23

## 2025-06-17 RX ORDER — CYCLOBENZAPRINE HCL 10 MG
10 TABLET ORAL 3 TIMES DAILY PRN
Qty: 30 TABLET | Refills: 0 | Status: SHIPPED | OUTPATIENT
Start: 2025-06-23 | End: 2025-07-03

## 2025-06-22 DIAGNOSIS — M17.12 PRIMARY OSTEOARTHRITIS OF LEFT KNEE: Primary | ICD-10-CM

## 2025-06-22 RX ORDER — MELOXICAM 15 MG/1
15 TABLET ORAL DAILY
Qty: 30 TABLET | Refills: 0 | Status: SHIPPED | OUTPATIENT
Start: 2025-06-22 | End: 2025-07-22

## 2025-06-23 ENCOUNTER — PHARMACY VISIT (OUTPATIENT)
Dept: PHARMACY | Facility: CLINIC | Age: 80
End: 2025-06-23
Payer: COMMERCIAL

## 2025-06-23 PROCEDURE — 27447 TOTAL KNEE ARTHROPLASTY: CPT | Performed by: ORTHOPAEDIC SURGERY

## 2025-06-23 PROCEDURE — 20985 CPTR-ASST DIR MS PX: CPT | Performed by: ORTHOPAEDIC SURGERY

## 2025-06-23 PROCEDURE — 27447 TOTAL KNEE ARTHROPLASTY: CPT | Performed by: PHYSICIAN ASSISTANT

## 2025-06-23 PROCEDURE — RXMED WILLOW AMBULATORY MEDICATION CHARGE

## 2025-07-14 DIAGNOSIS — Z96.652 S/P TOTAL KNEE ARTHROPLASTY, LEFT: Primary | ICD-10-CM

## 2025-07-17 ENCOUNTER — APPOINTMENT (OUTPATIENT)
Dept: ORTHOPEDIC SURGERY | Facility: CLINIC | Age: 80
End: 2025-07-17
Payer: MEDICARE

## 2025-07-17 ENCOUNTER — OFFICE VISIT (OUTPATIENT)
Dept: ORTHOPEDIC SURGERY | Facility: CLINIC | Age: 80
End: 2025-07-17
Payer: MEDICARE

## 2025-07-17 ENCOUNTER — HOSPITAL ENCOUNTER (OUTPATIENT)
Dept: RADIOLOGY | Facility: CLINIC | Age: 80
Discharge: HOME | End: 2025-07-17
Payer: MEDICARE

## 2025-07-17 DIAGNOSIS — Z96.652 S/P TOTAL KNEE ARTHROPLASTY, LEFT: Primary | ICD-10-CM

## 2025-07-17 DIAGNOSIS — Z96.652 S/P TOTAL KNEE ARTHROPLASTY, LEFT: ICD-10-CM

## 2025-07-17 PROCEDURE — 99212 OFFICE O/P EST SF 10 MIN: CPT | Performed by: ORTHOPAEDIC SURGERY

## 2025-07-17 PROCEDURE — 73562 X-RAY EXAM OF KNEE 3: CPT | Mod: LT

## 2025-07-17 NOTE — PROGRESS NOTES
No chief complaint on file.      The patient is here for follow-up of their side: left knee arthroplasty.  The patient has {severity:44525} knee pain.  The patient has {severity:45715} mechanical symptoms.  The patient has {severity:50235} swelling.  The patient is approximately {NUMBERS 0 - 12:34666} {DAYS, WEEKS, MONTHS, YEARS:51360} postop    Physical examination:    Examination of the side: left knee  The incision is {Incision status:94430}  No erythema or warmth.  No instability varus or valgus stressing the knee at 0, 30 or 60 degrees.  No instability in the AP plane at 90 degrees.  Range of motion: {knee extension:83896} degrees extension, {egrees of flexion to 180:68019} degrees flexion  There is {severity:95011} tenderness  Calf is soft, Homans negative  The patient has intact ankle dorsiflexion and plantarflexion.    Radiographs:   ECG 12 Lead  Normal sinus rhythm  Possible Left atrial enlargement  Nonspecific ST abnormality  Abnormal ECG  No previous ECGs available  Confirmed by Kofi Cardoza ([id number]) on 6/13/2025 1:31:34 PM        Impression:  Status post {side:64117} total knee arthroplasty    Plan:  Discussed the importance of prophylactic dental antibiotics  Outpatient physical therapy  Follow up in {Time; 1 week to 1 year:96252}  All questions answered

## 2025-07-17 NOTE — PROGRESS NOTES
Chief Complaint   Patient presents with    Left Knee - Pain, Injections     TKA 6-25-25   XRAY TODAY       The patient is here for follow-up of their side: left knee arthroplasty.  The patient has mild knee pain.  The patient has no mechanical symptoms.  The patient has mild swelling.  The patient is approximately 3 week(s) postop    Physical examination:    Examination of the side: left knee  The incision is healing well  No erythema or warmth.  No instability varus or valgus stressing the knee at 0, 30 or 60 degrees.  No instability in the AP plane at 90 degrees.  Range of motion: 0 degrees extension, 100 degrees flexion  There is mild tenderness  Calf is soft, Homans negative  The patient has intact ankle dorsiflexion and plantarflexion.    Radiographs:   XR knee left 3 views  Interpreted By:  Doyle Carter,   STUDY:  XR KNEE LEFT 3 VIEWS; ; 7/17/2025 3:58 pm      INDICATION:  Signs/Symptoms:pain.      ACCESSION NUMBER(S):  [id number]      ORDERING CLINICIAN:  DOYLE CARTER      FINDINGS:  Left knee films show a total knee arthroplasty in satisfactory  position. The patella is well positioned. No fracture is identified.  No obvious loosening or wear is appreciated. There are ghost tracks  seen in the femur and tibia consistent with intraoperative Steinmann  pin placement.          Signed by: Doyle Carter 7/17/2025 4:07 PM  Dictation workstation:   [id number]        Impression:  Status post side: left total knee arthroplasty    Plan:  Outpatient physical therapy  Follow up in 9 weeks with XR   All questions answered    In a face to face encounter, I evaluated the patient and performed a physical examination, discussed pertinent diagnostic studies if indicated and discussed diagnosis and management strategies with both the patient and physician assistant / nurse practitioner.  I reviewed the PA/NP's note and agree with the documented findings and plan of care.      Doyle Carter M.D.

## 2025-09-25 ENCOUNTER — APPOINTMENT (OUTPATIENT)
Dept: ORTHOPEDIC SURGERY | Facility: CLINIC | Age: 80
End: 2025-09-25
Payer: MEDICARE

## (undated) DEVICE — STRAP, ARM BOARD, 32 X 1.5

## (undated) DEVICE — SYRINGE, 60 CC, IRRIGATION, BULB, CONTRO-BULB, PAPER POUCH

## (undated) DEVICE — DRAPE, SHEET, 17 X 23 IN

## (undated) DEVICE — TOWEL PACK, STERILE, 4/PACK, BLUE

## (undated) DEVICE — CORD, FORCEP, BIPOLAR, DISP

## (undated) DEVICE — SYRINGE, CONTROL, ANGIOGRAPHIC, FIXED MALE LUER, 10 CC

## (undated) DEVICE — Device

## (undated) DEVICE — SYRINGE, 10 CC, LUER LOCK

## (undated) DEVICE — STRAP, VELCRO, BODY, 4 X 60IN, NS

## (undated) DEVICE — SPLINT, SAFETY, PRE-CUT, 3 X 12 IN

## (undated) DEVICE — GLOVE, SURGICAL, PROTEXIS PI BLUE W/NEUTHERA, 7.5, PF, LF

## (undated) DEVICE — GOWN, SURGICAL, ROYAL SILK, XXL, STERILE

## (undated) DEVICE — GLOVE, SURGICAL, PROTEXIS PI , 7.5, PF, LF

## (undated) DEVICE — CUFF, TOURNIQUET, DUAL PORT, SNG BLADDER, 18 IN, PLC

## (undated) DEVICE — NEEDLE, SAFETY, 18 G X 1.5 IN

## (undated) DEVICE — NEEDLE, SAFETY, 25 GA X 1.5 IN

## (undated) DEVICE — APPLICATOR, CHLORAPREP, W/ORANGE TINT, 26ML

## (undated) DEVICE — GLOVE, SURGICAL, PROTEXIS PI , 8.5, PF, LF

## (undated) DEVICE — SUTURE, ETHILON, 4-0, BLK, MONO, PS-2 18

## (undated) DEVICE — BANDAGE, ESMARK 4 IN X 9 FT, STERILE

## (undated) DEVICE — BANDAGE, ELASTIC, 4 X 10YD, BEIGE, LF